# Patient Record
Sex: FEMALE | Race: WHITE | NOT HISPANIC OR LATINO | Employment: UNEMPLOYED | URBAN - NONMETROPOLITAN AREA
[De-identification: names, ages, dates, MRNs, and addresses within clinical notes are randomized per-mention and may not be internally consistent; named-entity substitution may affect disease eponyms.]

---

## 2019-10-11 ENCOUNTER — OFFICE VISIT (OUTPATIENT)
Dept: URGENT CARE | Facility: PHYSICIAN GROUP | Age: 34
End: 2019-10-11
Payer: MEDICAID

## 2019-10-11 VITALS
SYSTOLIC BLOOD PRESSURE: 138 MMHG | OXYGEN SATURATION: 96 % | HEART RATE: 123 BPM | WEIGHT: 121 LBS | RESPIRATION RATE: 16 BRPM | DIASTOLIC BLOOD PRESSURE: 90 MMHG | TEMPERATURE: 98.2 F

## 2019-10-11 DIAGNOSIS — K04.7 DENTAL INFECTION: ICD-10-CM

## 2019-10-11 DIAGNOSIS — R22.0 LEFT FACIAL SWELLING: ICD-10-CM

## 2019-10-11 PROCEDURE — 99204 OFFICE O/P NEW MOD 45 MIN: CPT | Performed by: PHYSICIAN ASSISTANT

## 2019-10-11 RX ORDER — AMOXICILLIN AND CLAVULANATE POTASSIUM 875; 125 MG/1; MG/1
1 TABLET, FILM COATED ORAL 2 TIMES DAILY
Qty: 20 TAB | Refills: 0 | Status: SHIPPED | OUTPATIENT
Start: 2019-10-11

## 2019-10-11 RX ORDER — IBUPROFEN 200 MG
200 TABLET ORAL EVERY 6 HOURS PRN
COMMUNITY

## 2019-10-11 RX ORDER — IBUPROFEN 800 MG/1
800 TABLET ORAL EVERY 8 HOURS PRN
Qty: 30 TAB | Refills: 0 | Status: SHIPPED | OUTPATIENT
Start: 2019-10-11

## 2019-10-11 NOTE — PROGRESS NOTES
Chief Complaint   Patient presents with   • Oral Swelling     oral swelling on the (R) side        HISTORY OF PRESENT ILLNESS: Patient is a 34 y.o. female who presents today for the following:    Patient comes in for evaluation of facial swelling that started this morning.  She has had pain off and on for the last week or so on the left lower jaw.  She went to bed last night without any swelling and had extreme swelling this morning.  She does complain of significant pain.  She has not had any fevers.  She does not have an appoint with a dentist.  She has been taking Tylenol every 4 hours.    There are no active problems to display for this patient.      Allergies:Patient has no known allergies.    Current Outpatient Medications Ordered in Epic   Medication Sig Dispense Refill   • ibuprofen (MOTRIN) 200 MG Tab Take 200 mg by mouth every 6 hours as needed.     • amoxicillin-clavulanate (AUGMENTIN) 875-125 MG Tab Take 1 Tab by mouth 2 times a day. 20 Tab 0   • ibuprofen (MOTRIN) 800 MG Tab Take 1 Tab by mouth every 8 hours as needed for Mild Pain or Moderate Pain. 30 Tab 0     No current Epic-ordered facility-administered medications on file.        History reviewed. No pertinent past medical history.    Social History     Tobacco Use   • Smoking status: Current Every Day Smoker     Types: Cigarettes   • Smokeless tobacco: Never Used   Substance Use Topics   • Alcohol use: Not Currently   • Drug use: Yes     Types: Marijuana       No family status information on file.   History reviewed. No pertinent family history.    Review of Systems:   Constitutional ROS: No unexpected change in weight, No weakness, No fatigue  HEENT: Facial swelling.  Neck ROS: No swollen glands, No significant pain in neck  Pulmonary ROS: No chronic cough, sputum, or hemoptysis, No dyspnea on exertion, No wheezing  Cardiovascular ROS: No diaphoresis, No edema, No palpitations  Gastrointestinal ROS: No change in bowel habits, No significant  change in appetite, No nausea, vomiting, diarrhea, or constipation  Musculoskeletal/Extremities ROS: No peripheral edema, No pain, redness or swelling on the joints  Hematologic/Lymphatic ROS: No chills, No night sweats, No weight loss  Skin/Integumentary ROS: No edema, No evidence of rash, No itching      Exam:  /90   Pulse (!) 123   Temp 36.8 °C (98.2 °F)   Resp 16   Wt 54.9 kg (121 lb)   SpO2 96%   General: Well developed, well nourished. No distress.  HEENT:The left side of face is markedly swollen over the left mandible with associated tenderness.  No erythema is noted.  No gumline swelling, erythema, or drainage is noted.  Pulmonary: Unlabored respiratory effort. Lungs clear to auscultation, no wheezes, no rhonchi.  Cardiovascular: Regular rate and rhythm without murmur.    Neurologic: Grossly nonfocal. No facial asymmetry noted.  Lymph: No cervical lymphadenopathy noted.  Skin: Warm, dry, good turgor. No rashes in visible areas.   Psych: Normal mood. Alert and oriented x3. Judgment and insight is normal.    Assessment/Plan:  Use all medication as directed.  Discussed appropriate dosing of ibuprofen and acetaminophen.  Follow-up with a dentist as soon as possible.    1. Left facial swelling  amoxicillin-clavulanate (AUGMENTIN) 875-125 MG Tab    ibuprofen (MOTRIN) 800 MG Tab   2. Dental infection  amoxicillin-clavulanate (AUGMENTIN) 875-125 MG Tab    ibuprofen (MOTRIN) 800 MG Tab